# Patient Record
(demographics unavailable — no encounter records)

---

## 2025-04-25 NOTE — PLAN
[FreeTextEntry1] : pap smear IUD in proper position as per sono today STI labs today  RTC in 1 yr or as needed Self-breast exams advised and discussed with pt Role of diet and exercise advised in maintaining healthy weight and BMI  Foods rich in calcium and vitamin D as well as weight bearing exercised for bone health discussed with pt Following Primary care for health maintenance advised

## 2025-04-25 NOTE — HISTORY OF PRESENT ILLNESS
[LMP unknown] : LMP unknown [IUD] : has an intrauterine device [Y] : Patient is sexually active [N] : Patient denies prior pregnancies [FreeTextEntry1] : She is G0 here for annual check Hx of IUD Kyleena since 4/2021- pt advised its good for 5 yrs, she is satisfied with the IUD No menses since the IUD SA with her male partner- would like STI screening Does not have any gyn concerns She works FT as an RN at [employer]  . [PapSmeardate] : 8/11/22 [TextBox_31] : LSIL [GonorrheaDate] : 8/11/22 [TextBox_63] : Neg [ChlamydiaDate] : 8/11/22 [TextBox_68] : Neg [TrichomonasDate] : 9/19/22 [TextBox_73] : Neg [de-identified] : Kyleena (4/2021)  [PGHxTotal] : 0

## 2025-05-23 NOTE — PLAN
[FreeTextEntry1] : 28 y/o with ASCUS, HPV+ pap presenting for colposcopy. No complaints today - Colposcopy completed as above, f/u pathology results - IUD strings noted to be very short and tucked within external os, was able to expose with cytobrush but next year when Heidy is due for replacement, chance she may need a hysteroscope in the endocervical canal to remove. - Further follow up pending biopsy results  Dayne Bal MD

## 2025-05-23 NOTE — PROCEDURE
[Colposcopy] : Colposcopy  [Time out performed] : Pre-procedure time out performed.  Patient's name, date of birth and procedure confirmed. [Consent Obtained] : Consent obtained [Risks] : risks [Benefits] : benefits [Alternatives] : alternatives [Patient] : patient [Infection] : infection [Bleeding] : bleeding [Allergic Reaction] : allergic reaction [ASCUS] : ASCUS [HPV High Risk] : HPV high risk [No Premedication] : no premedication [SCI Fully Visualized] : SCI not fully visualized [ECC Performed] : ECC performed [No Abnormalities] : no abnormalities [Biopsy] : biopsy taken [Hemostasis Obtained] : Hemostasis obtained [Tolerated Well] : the patient tolerated the procedure well [de-identified] : 3 [de-identified] : endocervical curettage 12 o'clock 6 o'clock

## 2025-07-08 NOTE — PLAN
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 F/u pathology results - IUD removed without issue - Reviewed contraception pre-op, pt deferring for now, will discuss again at post-op visit. Reviewed no sex until then - Return in 3-4w for post-op  Dayne Bal MD

## 2025-07-08 NOTE — PROCEDURE
[IUD Removal] : intrauterine device (IUD) removal [Dysmenorrhea] : dysmenorrhea [Speculum Placed] : speculum placed [Strings Exposed - Cytobrush] : strings exposed - cytobrush [IUD Discarded] : IUD discarded [No Complications] : no complications [Heavy Vaginal Bleeding] : for heavy vaginal bleeding [Pelvic Pain] : for pelvic pain [Colposcopy with Loop Electrode Excision of the Cervix] : Colposcopy with loop electrode excision of the cervix [Time out performed] : Pre-procedure time out performed.  Patient's name, date of birth and procedure confirmed. [Consent Obtained] : Consent obtained [Severe Dysplasia] : severe dysplasia [Risks] : risks [Benefits] : benefits [Alternatives] : alternatives [Patient] : patient [Infection] : infection [Bleeding] : bleeding [Allergic Reaction] : allergic reaction [Injury to Vagina] : injury to vagina [Injury to Cervix] : injury to cervix [Negative] : negative pregnancy test [No Premedication] : no premedication [SCJ Fully Visualized] : SCJ fully visualized [Cutting Setting ___watts] : cutting setting [unfilled] angulo [Coag Setting ___watts] : coag setting [unfilled] angulo [ECC Done] : ECC done [Hemostasis Obtained] : Hemostasis obtained [Sent to Pathology] : the specimen was placed in buffered formalin and sent for pathology [Tolerated Well] : the patient tolerated the procedure well